# Patient Record
Sex: MALE | Race: WHITE
[De-identification: names, ages, dates, MRNs, and addresses within clinical notes are randomized per-mention and may not be internally consistent; named-entity substitution may affect disease eponyms.]

---

## 2019-12-03 ENCOUNTER — HOSPITAL ENCOUNTER (INPATIENT)
Dept: HOSPITAL 96 - M.2W | Age: 84
LOS: 2 days | Discharge: HOME | DRG: 309 | End: 2019-12-05
Attending: INTERNAL MEDICINE | Admitting: INTERNAL MEDICINE
Payer: COMMERCIAL

## 2019-12-03 VITALS — HEIGHT: 67.99 IN | BODY MASS INDEX: 24.4 KG/M2 | WEIGHT: 161 LBS

## 2019-12-03 VITALS — DIASTOLIC BLOOD PRESSURE: 71 MMHG | SYSTOLIC BLOOD PRESSURE: 112 MMHG

## 2019-12-03 DIAGNOSIS — Z88.0: ICD-10-CM

## 2019-12-03 DIAGNOSIS — I48.92: ICD-10-CM

## 2019-12-03 DIAGNOSIS — Z87.891: ICD-10-CM

## 2019-12-03 DIAGNOSIS — I48.91: Primary | ICD-10-CM

## 2019-12-03 DIAGNOSIS — Z79.82: ICD-10-CM

## 2019-12-03 DIAGNOSIS — E78.5: ICD-10-CM

## 2019-12-03 DIAGNOSIS — I35.0: ICD-10-CM

## 2019-12-03 DIAGNOSIS — Z79.899: ICD-10-CM

## 2019-12-03 DIAGNOSIS — K21.9: ICD-10-CM

## 2019-12-03 DIAGNOSIS — Z79.84: ICD-10-CM

## 2019-12-03 DIAGNOSIS — I25.10: ICD-10-CM

## 2019-12-03 DIAGNOSIS — I10: ICD-10-CM

## 2019-12-03 DIAGNOSIS — D68.69: ICD-10-CM

## 2019-12-03 DIAGNOSIS — E11.9: ICD-10-CM

## 2019-12-03 DIAGNOSIS — Z95.1: ICD-10-CM

## 2019-12-03 NOTE — EKG
[place] [address]
Phone:  [phone]                     ELECTROCARDIOGRAM REPORT      
_______________________________________________________________________________
 
Name:       PARKERJOSSUE                Room:           [address]    ADM IN  
M.R.#:  [id number]      Account #:      [id number]  
Admission:  19     Attend Phys:    Husam Up MD, F
Discharge:               Date of Birth:  [birth year]  
         Report #: [id number]
    [id number]
_______________________________________________________________________________
THIS REPORT FOR:  //name//                      
 
                          [facility]
                                       
Test Date:    2019               Test Time:    12:12:03
Pat Name:     JOSSUE PARKER          Department:   
Patient ID:   [id number]            Room:         [address]
Gender:       M                        Technician:   
:          [id number]               Requested By: Parris Flynn
Order Number: [id number]    Reading MD:     
                                 Measurements
Intervals                              Axis          
Rate:         78                       P:            84
RI:           300                      QRS:          -1
QRSD:         105                      T:            5
QT:           392                                    
QTc:          447                                    
                           Interpretive Statements
Sinus rhythm
Atrial premature complexes
Prolonged RI interval
No previous ECG available for comparison
[email or web address]
 
 
 
 
 
 
 
 
 
 
 
 
 
 
 
 
 
 
 
 
                         
                                           By:                               
                   
D: 19   _____________________________________
T: 19   Epiphany Epiphany, MD           /EPI

## 2019-12-04 VITALS — DIASTOLIC BLOOD PRESSURE: 61 MMHG | SYSTOLIC BLOOD PRESSURE: 111 MMHG

## 2019-12-04 VITALS — DIASTOLIC BLOOD PRESSURE: 55 MMHG | SYSTOLIC BLOOD PRESSURE: 135 MMHG

## 2019-12-04 VITALS — SYSTOLIC BLOOD PRESSURE: 104 MMHG | DIASTOLIC BLOOD PRESSURE: 60 MMHG

## 2019-12-04 VITALS — DIASTOLIC BLOOD PRESSURE: 58 MMHG | SYSTOLIC BLOOD PRESSURE: 95 MMHG

## 2019-12-04 VITALS — SYSTOLIC BLOOD PRESSURE: 130 MMHG | DIASTOLIC BLOOD PRESSURE: 55 MMHG

## 2019-12-04 VITALS — DIASTOLIC BLOOD PRESSURE: 70 MMHG | SYSTOLIC BLOOD PRESSURE: 130 MMHG

## 2019-12-04 NOTE — NUR
ASSUMED CARE OF PT AT 0730. PT A&0X4, DENIES ANY PAIN OR SHORTNESS OF BREATH.
TRACING AFLUTTER ON THE CARDIAC MONITOR THIS AM-CARDIZEM GTT INFUSING AT
5ML/HR. PT STARTED ON SOTALOL PER CARDIOLOGY. PT NOTED TO CONVERT AT
APPROXIMATELY 1030 TO SINUS RHYTHM. EKG OBTAINED AND PLACED IN CHART.
PROLONGED ME INTERVAL NOTED. SOTALOL DISCONTINUED AND PT TRANSITIONED TO PO
CARDIZEM. BLOOD PRESSURE STABLE. CURRENTLY TRACING SR WITH FIRST DEGREE ON THE
CARDIAC MONITOR. ON RA SAT UPPER 90'S. PT UP SBA TO BATHROOM. PROBABLE
DISCHARGE HOME TOMORROW 12/5. AM ASSESSMENT AS CHARTED. MEDICATIONS PER MAR.
PT REPOSITIONS SELF. HOURLY ROUNDING OBSERVED. BED IN LOW POSITION. CALL LIGHT
WITHIN REACH. WILL CONTINUE PLAN OF CARE.

## 2019-12-04 NOTE — NUR
Pt is A&O. Resides at home with his dtr. Independent with ADLs. Pt states that
he keeps his room clean, but dtr does majority of IADLs. Pt uses a walker at
night when he gets up to use the restroom. No hx of HH or SNF. Goal is home at
[place]. Pt states that he may be having a cardioversion. Following.

## 2019-12-04 NOTE — EKG
[place] [address]
Phone:  [phone]                     ELECTROCARDIOGRAM REPORT      
_______________________________________________________________________________
 
Name:       JOSSUE PARKER                Room:           [address]    ADM IN  
M.R.#:  [id number]      Account #:      [id number]  
Admission:  19     Attend Phys:    Husam Up MD, F
Discharge:               Date of Birth:  [birth year]  
         Report #: [id number]
    [id number]
_______________________________________________________________________________
THIS REPORT FOR:  //name//                      
 
                          [facility]
                                       
Test Date:    2019               Test Time:    08:47:06
Pat Name:     JOSSUE PARKER          Department:   
Patient ID:   [id number]            Room:         [address]
Gender:       M                        Technician:   
:          [id number]               Requested By: Husam Up
Order Number: [id number]    Javad MD:   Gaetano Rodarte
                                 Measurements
Intervals                              Axis          
Rate:         95                       P:            
IN:                                    QRS:          -63
QRSD:         101                      T:            -15
QT:           357                                    
QTc:          449                                    
                           Interpretive Statements
Atrial flutter
LAD, consider left anterior fascicular block
Probable anteroseptal infarct, old
Borderline repolarization abnormality
No previous ECG available for comparison
 
Electronically Signed On 2019 16:59:20 CST by Gaetano Rodarte
[email or web address]
 
 
 
 
 
 
 
 
 
 
 
 
 
 
 
 
 
  <ELECTRONICALLY SIGNED>
                                           By: Gaetano Rodarte MD, [facility]   
  19     1659
D: 1947   _____________________________________
T: 19   Gaetano Rodarte MD, [facility]     /EPI

## 2019-12-04 NOTE — NUR
Pt is aox4, running a-flutter on telemetry, respirations are even and
unlabored. Pt is not in acute distress at this time.

## 2019-12-05 VITALS — SYSTOLIC BLOOD PRESSURE: 131 MMHG | DIASTOLIC BLOOD PRESSURE: 62 MMHG

## 2019-12-05 VITALS — DIASTOLIC BLOOD PRESSURE: 66 MMHG | SYSTOLIC BLOOD PRESSURE: 117 MMHG

## 2019-12-05 VITALS — SYSTOLIC BLOOD PRESSURE: 119 MMHG | DIASTOLIC BLOOD PRESSURE: 62 MMHG

## 2019-12-05 NOTE — EKG
[place] [address]
Phone:  [phone]                     ELECTROCARDIOGRAM REPORT      
_______________________________________________________________________________
 
Name:       JOSSUE PARKER                Room:           [address]    DIS IN  
M.R.#:  [id number]      Account #:      [id number]  
Admission:  19     Attend Phys:    Husam Up MD, F
Discharge:  19     Date of Birth:  [birth year]  
         Report #: [id number]
    [id number]
_______________________________________________________________________________
THIS REPORT FOR:  //name//                      
 
                          [facility]
                                       
Test Date:    2019               Test Time:    08:40:20
Pat Name:     JOSSUE PARKER          Department:   
Patient ID:   [id number]            Room:         [address]
Gender:       M                        Technician:   
:          [id number]               Requested By: Husam Up
Order Number: [id number]    Reading MD:   Guillaume Fontaine
                                 Measurements
Intervals                              Axis          
Rate:         61                       P:            8
AR:           292                      QRS:          3
QRSD:         104                      T:            4
QT:           424                                    
QTc:          427                                    
                           Interpretive Statements
Sinus rhythm
Atrial premature complexes
Prolonged AR interval
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
Compared to ECG 2019 08:47:06
Atrial premature complex(es) now present
First degree AV block now present
Atrial flutter no longer present
Myocardial infarct finding still present
 
Electronically Signed On 2019 15:59:44 CST by Guillaume Fontaine
[email or web address]
 
 
 
 
 
 
 
 
 
 
 
 
 
  <ELECTRONICALLY SIGNED>
                                           By: Guillaume Fontaine MD, FACC     
  19     1559
D: 19 0840   _____________________________________
T: 19 0840   Guillaume Fontaine MD, FAC       /EPI

## 2019-12-05 NOTE — NUR
Pt is aox4, running 1st degree AV block with PACs, respirations are even and
unlabored. Pt is not in acute distress at this time. Will continue to monitor.

## 2019-12-05 NOTE — NUR
ASSUMED CARE OF PT AT 0730. PT SITTING UP IN THE CHAIR WAITING FOR BREAKFAST.
FAMILY AT BEDSIDE. PT A&0X4, DENIES ANY PAIN OR SHORTNESS OF BREATH AT THIS
TIME. TRACING SR WITH FIRST DEGREE ON THE CARDIAC MONITOR. ON RA SAT UPPER
90'S. PT UP AD NEFTALI IN ROOM. PT GOAL FOR TODAY COMPLETE ECHO AND DISCHARGE
PLANNING TO HOME. AM ASSESSMENT AS CHARTED. MEDICATIONS PER MAR. PT
REPOSITIONS SELF. HOURLY ROUNDING OBSERVED. BED IN LOW POSITION. CALL LIGHT
WITHIN REACH. WILL CONTINUE PLAN OF CARE.

## 2019-12-05 NOTE — NUR
PT COMPLETED ECHO. DISCHARGE ORDERS RECEIVED. DISCHARGE INSTRUCTIONS, CARE
NOTES, SCRIPTS AND FOLLOW UP APPTS GIVEN TO PT. PT COMMUNICATES UNDERSTANDING
OF DISCHARGE TEACHING. BOTH IV'S AND CARDIAC MONITOR REMOVED. PT DISCHARGED
WITH ALL BELONGINGS AND PAPERWORK VIA WHEELCHAIR WITH NURSING STAFF TO SONS
OWN PERSONAL VEHICLE.

## 2019-12-05 NOTE — EKG
[place] [address]
Phone:  [phone]                     ELECTROCARDIOGRAM REPORT      
_______________________________________________________________________________
 
Name:       JOSSUE PARKER                Room:           [address]    DIS IN  
M.R.#:  [id number]      Account #:      [id number]  
Admission:  19     Attend Phys:    Husam Up MD, F
Discharge:  19     Date of Birth:  [birth year]  
         Report #: [id number]
    [id number]
_______________________________________________________________________________
THIS REPORT FOR:  //name//                      
 
                          [facility]
                                       
Test Date:    2019               Test Time:    12:12:03
Pat Name:     JOSSUE PARKER          Department:   
Patient ID:   [id number]            Room:         [address]
Gender:       M                        Technician:   
:          [id number]               Requested By: Husam Up
Order Number: [id number]    Javad MD:   Guillaume Fontaine
                                 Measurements
Intervals                              Axis          
Rate:         78                       P:            84
HI:           300                      QRS:          -1
QRSD:         105                      T:            5
QT:           392                                    
QTc:          447                                    
                           Interpretive Statements
Sinus rhythm
Atrial premature complexes
Prolonged HI interval
Compared to ECG 2019 08:47:06
Atrial premature complex(es) now present
First degree AV block now present
Atrial flutter no longer present
Myocardial infarct finding no longer present
 
Electronically Signed On 2019 15:47:28 CST by Guillaume Fontaine
[email or web address]
 
 
 
 
 
 
 
 
 
 
 
 
 
 
  <ELECTRONICALLY SIGNED>
                                           By: Guillaume Fontaine MD, [facility]     
  19     1547
D: 19 1212   _____________________________________
T: 19 1212   Guillaume Fontaine MD, [facility]       /EPI

## 2019-12-05 NOTE — 2DMMODE
[place] [address]
Phone:  [phone] 2 D/M-MODE ECHOCARDIOGRAM     
_______________________________________________________________________________
 
Name:         JOSSUE PARKER               Room:          [address] [place] 
.CRYSTAL.#:    [id number]     Account #:     [id number]  
Admission:    19    Attend Phys:   Husam Up MD
Discharge:    19    Date of Birth: [birth year]  
Date of Service: 19 1551  Report #:      [id number]
        [id number]
_______________________________________________________________________________
THIS REPORT FOR:  //name//                      
 
 
--------------- APPROVED REPORT --------------
 
 
Study performed:  2019 10:39:18
 
EXAM: Comprehensive 2D, Doppler, and color-flow 
Echocardiogram 
Patient Location: In-Patient   
[facility]     Status:  routine
 
      BSA:         1.86
HR: 67 bpm BP:          117/66 mmHg 
Rhythm: NSR     
 
Other Information 
Study Quality: Good
 
Indications
Atrial Fibrillation
 
2D Dimensions
IVSd:  6.02 (7-11mm) LVOT Diam:  18.55 (18-24mm) 
LVDd:  52.84 mm  
PWd:  9.22 (7-11mm) Ascending Ao:  32.26 (22-36mm)
LVDs:  31.20 (25-40mm) 
Aortic Root:  30.18 mm 
 
Volumes
Left Atrial Volume (Systole) 
    LA ESV Index:  34.90 mL/m2
 
Aortic Valve
AoV Peak Juan Carlos.:  1.49 m/s 
AO Peak Gr.:  8.89 mmHg  LVOT Max P.30 mmHg
AO Mean Gr.:  5.14 mmHg  LVOT Mean P.31 mmHg
    LVOT Max V:  0.76 m/s
AO V2 VTI:  32.99 cm  LVOT Mean V:  0.54 m/s
JONNA (VTI):  1.51 cm2  LVOT V1 VTI:  18.47 cm
 
Mitral Valve
    E/A Ratio:  1.37
    MV Decel. Time:  165.76 ms
MV E Max Juan Carlos.:  1.09 m/s 
 
 
[place] [address]
Phone:  [phone]                     2 D/M-MODE ECHOCARDIOGRAM     
_______________________________________________________________________________
 
Name:         JOSSUE PARKER               Room:          [address] [place] 
M.R.#:    [id number]     Account #:     [id number]  
Admission:    19    Attend Phys:   Husam Up MD
Discharge:    19    Date of Birth: [birth year]  
Date of Service: 19 1551  Report #:      [id number]
        [id number]
_______________________________________________________________________________
MV PHT:  48.07 ms  
MVA (PHT):  4.58 cm2  
 
TDI
E/Lateral E':  9.08 E/Medial E':  15.57
   Medial E' Juan Carlos.:  0.07 m/s
   Lateral E' Juan Carlos.:  0.12 m/s
 
Pulmonary Valve
PV Peak Juan Carlos.:  0.92 m/s PV Peak Gr.:  3.38 mmHg
 
Tricuspid Valve
    RAP Estimate:  5.00 mmHg
TR Peak Gr.:  26.35 mmHg RVSP:  31.00 mmHg
    PA Pressure:  31.00 mmHg
 
Left Ventricle
The left ventricle is normal size. There is normal LV segmental wall 
motion. There is normal left ventricular wall thickness. Left 
ventricular systolic function is normal. LVEF is 55-60%. Left 
ventricular filling pattern is normal for age.
 
Right Ventricle
Right ventricle is moderately dilated. The right ventricular systolic 
function is normal.
 
Atria
Left atrium is mildly dilated. Right atrium is moderately 
dilated.
 
Aortic Valve
Mild to moderate aortic valve sclerosis. Trace aortic regurgitation. 
Mild aortic stenosis.
 
Mitral Valve
The mitral valve is normal in structure. Trace mitral regurgitation. 
No evidence of mitral valve stenosis.
 
Tricuspid Valve
The tricuspid valve is normal in structure. Mild tricuspid 
regurgitation. Mild pulmonary hypertension.
 
Pulmonic Valve
The pulmonary valve is normal in structure. Trace pulmonic 
regurgitation.
 
 
 
[place] [address]
Phone:  [phone] 2 D/M-MODE ECHOCARDIOGRAM     
_______________________________________________________________________________
 
Name:         JOSSUE PARKER               Room:          [address] [place] 
..#:    [id number]     Account #:     [id number]  
Admission:    19    Attend Phys:   Husam Up MD
Discharge:    19    Date of Birth: [birth year]  
Date of Service: 19 1551  Report #:      [id number]
        [id number]
_______________________________________________________________________________
Great Vessels
The aortic root is normal in size. IVC is normal in size and 
collapses >50% with inspiration.
 
Pericardium
There is no pericardial effusion.
 
<Conclusion>
The left ventricle is normal size.
There is normal left ventricular wall thickness.
Left ventricular systolic function is normal.
LVEF is 55-60%.
Left ventricular filling pattern is normal for age.
Right ventricle is moderately dilated.
Left atrium is mildly dilated.
Right atrium is moderately dilated.
Mild to moderate aortic valve sclerosis.
Trace aortic regurgitation.
Mild aortic stenosis.
Trace mitral regurgitation.
Mild tricuspid regurgitation.
Mild pulmonary hypertension.
IVC is normal in size and collapses >50% with inspiration.
 
 
 
 
 
 
 
 
 
 
 
 
 
 
 
 
 
 
 
 
 
  <ELECTRONICALLY SIGNED>
                                           By: Gaetano Rodarte MD, FACC   
  19     1551
D: 19 155   _____________________________________
T: 19 155   Gaetano Rodarte MD, FACC     /INF